# Patient Record
Sex: MALE
[De-identification: names, ages, dates, MRNs, and addresses within clinical notes are randomized per-mention and may not be internally consistent; named-entity substitution may affect disease eponyms.]

---

## 2017-04-19 NOTE — CT
EXAM:

  CT Abdomen and Pelvis With Intravenous Contrast



CLINICAL HISTORY:

  62 years old, male; Pain; Abdominal pain; Generalized; Additional info: 

Increasing abdominal girth ? ascites



TECHNIQUE:

  Axial computed tomography images of the abdomen and pelvis with intravenous 

contrast.

  Coronal and sagittal reformatted images were created and reviewed.



CONTRAST:

  100 mL of visipaque 320 administered intravenously.



EXAM DATE/TIME:

  4/19/2017 5:58 PM



COMPARISON:

  No relevant prior studies available.



FINDINGS:

  LIMITATIONS:  Exam is somewhat limited by mild streak/motion artifact.

  LOWER THORAX:  Heart appears moderately enlarged.  Small hiatal hernia.  

Gastroesophageal reflux.



 ABDOMEN:

  LIVER:  Fatty infiltration of the liver.

  GALLBLADDER AND BILE DUCTS:  Multiple small stones in the gallbladder neck.  

No CT evidence of acute cholecystitis.

  PANCREAS:  No CT evidence of acute pancreatitis.

  SPLEEN:  Spleen is mildly enlarged, measuring 14.8 cm in length.

  ADRENALS:  No acute abnormality of the adrenal glands identified.

  KIDNEYS AND URETERS:  No acute abnormality of the kidneys identified. No 

evidence of significant hydrouereteronephrosis.

  STOMACH AND BOWEL:  Retained stool noted throughout the colon.  Otherwise, no 

significant abnormality of the bowel is identified.  No evidence of large bowel 

obstruction.  No evidence of small bowel obstruction.  No acute abnormality of 

the stomach or duodenum identified.

  APPENDIX:  Appendix is seen, and is within normal limits in appearance.



 PELVIS:

  BLADDER:  No acute abnormality of the bladder identified.

  REPRODUCTIVE:  No acute abnormality of the reproductive organs is seen.



 ABDOMEN and PELVIS:

  INTRAPERITONEAL SPACE:  No evidence of free intraperitoneal air or fluid.

  BONES/JOINTS:  No acute fractures or other acute bony abnormality noted.

  SOFT TISSUES:  No acute abnormality of the visualized soft tissues is seen.

  VASCULATURE:  No evidence of abdominal aortic aneurysm. No evidence of 

periaortic hemorrhage.

  LYMPH NODES:  No evidence of diffuse lymphadenopathy.



IMPRESSION:     

- No evidence of significant acute process.

- Multiple stones in the gallbladder neck. No CT evidence of acute 

cholecystitis.

- Mild splenomegaly.

- See above for remaining findings.

## 2017-04-19 NOTE — C.PDOC
History Of Present Illness


61 y/o male sent to ED by PMD to rule out DVT. Pt is poor historian, pmhx HTN 

and diabetes on unknown medications. Pt complains of bilateral leg swelling and 

new onset ascites for the past 2 months. Pt also reports area to right lower 

leg with wound and tenderness. PE denies pain, fever, chills, chest pain, SOB, 

abdominal pain or any other complaints. History or skull injury with surgery 7 

years ago.


Chief Complaint (Nursing): Lower Extremity Problem/Injury


History Per: Patient


History/Exam Limitations: no limitations


Onset/Duration Of Symptoms: Days


Current Symptoms Are (Timing): Still Present


Severity: Moderate


Recent travel outside of the [place]: No





Past Medical History


Reviewed: Historical Data, Nursing Documentation, Vital Signs


Vital Signs: 


 Last Vital Signs











Temp  98.6 F   04/19/17 17:34


 


Pulse  98 H  04/19/17 17:34


 


Resp  18   04/19/17 17:34


 


BP  170/92 H  04/19/17 17:34


 


Pulse Ox  98   04/19/17 18:12














- Medical History


PMH: HTN


   Denies: Chronic Kidney Disease


Family History: States: Unknown Family Hx





- Social History


Hx Alcohol Use: No (DENIES)


Hx Substance Use: No





- Immunization History


Hx Tetanus Toxoid Vaccination: No


Hx Influenza Vaccination: No


Hx Pneumococcal Vaccination: No





Review Of Systems


Except As Marked, All Systems Reviewed And Found Negative.


Constitutional: Negative for: Fever, Chills


Cardiovascular: Negative for: Chest Pain


Respiratory: Negative for: Shortness of Breath


Gastrointestinal: Negative for: Abdominal Pain


Musculoskeletal: Positive for: Other (bilateral leg swelling; right leg with 

wound and tenderness)





Physical Exam





- Physical Exam


Appears: Non-toxic, No Acute Distress


Skin: Warm, Dry, No Rash


Head: Atraumatic, Normacephalic, Other (old, well healed surgical scar to scalp)


Neck: Normal ROM, Supple


Chest: Symmetrical


Cardiovascular: Rhythm Regular


Respiratory: Normal Breath Sounds, No Rales, No Rhonchi, No Wheezing


Gastrointestinal/Abdominal: Normal Exam, Soft, No Tenderness


Extremity: Pedal Edema (2+ bilateral legs), Other (right medial lower extremity 

with erythematous old abrasion, slightly warm to touch)





ED Course And Treatment


O2 Sat by Pulse Oximetry: 98 (on room air)


Pulse Ox Interpretation: Normal





Medical Decision Making


Medical Decision Making: 


Plan: CT abd, labs, UA, IV fluids





Disposition





- Disposition


Disposition Time: 19:00


Condition: UNKNOWN





- Clinical Impression


Clinical Impression: 


 Abdominal pain, Swelling of lower extremity





- Scribe Statement


The provider has reviewed the documentation as recorded by the Scribe


Sincere Fernandez


Provider Attestation: 





All medical record entries made by the Scribe were at my direction and 

personally dictated by me. I have reviewed the chart and agree that the record 

accurately reflects my personal performance of the history, physical exam, 

medical decision making, and the department course for this patient. I have 

also personally directed, reviewed, and agree with the discharge instructions 

and disposition.





Physician Patient Turnover


Patient Signed Over To: Omar Cali


Handoff Comments: pending labs, CT

## 2017-04-21 NOTE — CP.PCM.PN
Subjective





- Date & Time of Evaluation


Date of Evaluation: 04/20/17


Time of Evaluation: 09:34





- Subjective


Subjective: 


Pt seen and examined, he is for resection of mass in maxillary bone, b/l lower 

extremity edema and swelling still there associated with pain and erythema





Objective





- Vital Signs/Intake and Output


Vital Signs (last 24 hours): 


 











Temp Pulse Resp BP Pulse Ox


 


 97.9 F   90   20   137/76   95 


 


 04/20/17 23:20  04/20/17 23:20  04/20/17 23:20  04/20/17 23:20  04/20/17 23:20








Intake and Output: 


 











 04/20/17 04/21/17





 18:59 06:59


 


Intake Total 500 


 


Balance 500 














- Medications


Medications: 


 Current Medications





Clindamycin HCl (Cleocin)  150 mg PO Q8H [facility]


   Last Admin: 04/20/17 23:35 Dose:  150 mg


Enoxaparin Sodium (Lovenox)  40 mg SC DAILY [facility]


   Last Admin: 04/20/17 10:56 Dose:  40 mg


Hydrochlorothiazide (Hydrodiuril)  25 mg PO DAILY [facility]


   Last Admin: 04/20/17 10:56 Dose:  25 mg


Insulin Aspart (Novolog)  0 unit SC [facility]


   PRN Reason: Protocol


   Last Admin: 04/20/17 22:13 Dose:  Not Given


Lisinopril (Zestril)  40 mg PO DAILY [facility]


   Last Admin: 04/20/17 10:56 Dose:  40 mg


Nifedipine (Procardia Xl)  90 mg PO DAILY [facility]


   Last Admin: 04/20/17 12:09 Dose:  90 mg


Pneumococcal Polyvalent Vaccine (Pneumovax 23 Vaccine)  0.5 ml IM .ONCE ONE


   Stop: 04/21/17 10:01











- Labs


Labs: 


 





 04/20/17 11:47 





 04/20/17 11:47 











- Constitutional


Appears: No Acute Distress





- Head Exam


Head Exam: ATRAUMATIC, NORMAL INSPECTION, NORMOCEPHALIC





- Eye Exam


Eye Exam: EOMI, Normal appearance, PERRL


Pupil Exam: NORMAL ACCOMODATION, PERRL





- Respiratory Exam


Respiratory Exam: Clear to Ausculation Bilateral, NORMAL BREATHING PATTERN





- Cardiovascular Exam


Cardiovascular Exam: REGULAR RHYTHM, +S1, +S2.  absent: Murmur





- GI/Abdominal Exam


GI & Abdominal Exam: Soft, Normal Bowel Sounds.  absent: Tenderness





- Extremities Exam


Additional comments: 


+4 pitting edema





Assessment and Plan


(1) Polycythemia vera


Status: Chronic   





(2) Diabetes mellitus


Status: Chronic   





(3) Hypertension


Status: Chronic   





(4) Bilateral leg edema


Status: Acute   





(5) Dental abscess


Status: Acute

## 2017-04-21 NOTE — VASCLAB
PROCEDURE:  Lower Extremity Venous Duplex Exam.



HISTORY:

MELISA LEG SWELLING 



PRIORS:

Last exam 7/5/2016,normal.



TECHNIQUE:

Bilateral common femoral, femoral, popliteal and posterior tibial, 

peroneal and great saphenous veins were evaluated. Flow was assessed 

with color Doppler, compressibility, assessment of phasic flow and 

augmentation response.



Report prepared by   HEENA Corbin



FINDINGS:



RIGHT:

1. Common Femoral Vein: 



1.1. Compressibility - Fully compressible: Thrombus -  None : Flow - 

Phasic: Augmentation -Normal: Reflux - None.



2. Femoral Vein:



2.1. Compressibility - Fully compressible: Thrombus -  None : Flow - 

Phasic: Augmentation -Normal: Reflux - None.



3. Popliteal Vein: 



3.1. Compressibility - Fully compressible: Thrombus - None :  Flow - 

Phasic: Augmentation -Normal: Reflux - None.



4. Posterior Tibial Vein: 



4.1. Compressibility - Fully compressible: Thrombus -  None: Flow - 

Phasic: Augmentation -Normal: Reflux - None.



5. Peroneal Vein:



5.1. Compressibility - Fully compressible: Thrombus -  None: Flow - 

Phasic: Augmentation -Normal: Reflux - None.



6. Great Saphenous Vein:

6.1. Compressibility - Fully compressible: Thrombus - None: Flow - 

Phasic: Augmentation - Normal: Reflux - None.





LEFT:

1. Common Femoral Vein:



1.1.  Compressibility - Fully compressible: Thrombus -  None: Flow - 

Phasic: Augmentation -Normal: Reflux - None.



2. Femoral Vein:



2.1.  Compressibility - Fully compressible: Thrombus -  None: Flow - 

Phasic: Augmentation -Normal: Reflux - None.



3. Popliteal Vein:



3.1.  Compressibility - Fully compressible: Thrombus -  None : Flow - 

Phasic: Augmentation -Normal: Reflux - None.



4. Posterior Tibial Vein:



4.1.  Compressibility - Fully compressible: Thrombus -  None: Flow - 

Phasic: Augmentation -Normal: Reflux - None.



5. Peroneal Vein:



5.1.  Compressibility - Fully compressible: Thrombus -  None: Flow - 

Phasic: Augmentation -Normal: Reflux - None.



6. Great Saphenous Vein:

6.1.  Compressibility - Fully compressible: Thrombus -  None: Flow - 

Phasic: Augmentation - Normal: Reflux - None.





OTHER FINDINGS:  Right: None significant.



Left: None significant.



IMPRESSION:

Right: 



No evidence of deep or superficial vein thrombosis of the right lower 

extremity. Normal valve function noted of the right side.     



Left: 



No evidence of deep or superficial vein thrombosis of the left lower 

extremity. Normal valve function noted of the left side.

## 2017-04-21 NOTE — CP.PCM.HP
History of Present Illness





- History of Present Illness


History of Present Illness: 


CC: Lower extremity swelling associated with pain, erythema 





HPI:61 y/o male with h/o diabtes, HTn< polycythemia vera,has a mass in his left 

maxillary bone sent to ED by me due to b/l LE edema, no discharge no purulent 

ulcer was seen , i sent to rule out DVT.. Pt also reports area to right lower 

leg with wound and tenderness. PE denies pain, fever, chills, chest pain, SOB, 

abdominal pain or any other complaints. History or skull injury with surgery 7 

years ago.





Present on Admission





- Present on Admission


Any Indicators Present on Admission: No





Review of Systems





- Review of Systems


Systems not reviewed;Unavailable: Acuity of Condition





- Constitutional


Constitutional: Fatigue, Lethargy, Malaise





- EENT


Eyes: absent: As Per HPI, Blind Spots, Blurred Vision, Change in Vision, 

Decreased Night Vision, Diplopia, Discharge, Dry Eye, Exophthalmos, Floaters, 

Irritation, Itchy Eyes, Loss of Peripheral Vision, Pain, Photophobia, Requires 

Corrective Lenses, Sees Flashes, Spots in Vision, Tunnel Vision, Other Visual 

Disturbances, Loss of Vision, Other


Ears: absent: As Per HPI, Decreased Hearing, Ear Discharge, Ear Pain, Tinnitus, 

Abnormal Hearing, Disequilibrium, Dizziness, Other


Nose/Mouth/Throat: absent: As Per HPI, Epistaxis, Nasal Congestion, Nasal 

Discharge, Nasal Obstruction, Nasal Trauma, Nose Pain, Post Nasal Drip, Sinus 

Pain, Sinus Pressure, Bleeding Gums, Change in Voice, Dental Pain, Dry Mouth, 

Dysphagia, Halitosis, Hoarsness, Lip Swelling, Mouth Lesions, Mouth Pain, 

Odynophagia, Sore Throat, Throat Swelling, Tongue Swelling, Facial Pain, Neck 

Pain, Neck Mass, Other





- Cardiovascular


Cardiovascular: absent: As Per HPI, Acrocyanosis, Chest Pain, Chest Pain at Rest

, Chest Pain with Activity, Claudication, Diaphoresis, Dyspnea, Dyspnea on 

Exertion, Edema, Irregular Heart Rhythm, Pain Radiating to Arm/Neck/Jaw, Leg 

Edema, Leg Ulcers, Lightheadedness, Orthopnea, Palpitations, Paroxysmal 

Nocturnal Dyspnea, Pedal Edema, Radiating Pain, Rapid Heart Rate, Slow Heart 

Rate, Syncope, Other





- Respiratory


Respiratory: absent: As Per HPI, Cough, Dyspnea, Hemoptysis, Dyspnea on Exertion

, Wheezing, Snoring, Stridor, Pain on Inspiration, Chest Congestion, Excessive 

Mucous Production, Change in Mucous Color, Pain with Coughing, Other





- Gastrointestinal


Gastrointestinal: absent: As Per HPI, Abdominal Pain, Belching, Bloating, 

Change in Bowel Habits, Change in Stool Character, Coffee Ground Emesis, 

Constipation, Cramping, Diarrhea, Dyspepsia, Dysphagia, Early Satiety, 

Excessive Flatus, Fecal Incontinence, Heartburn, Hematemesis, Hematochezia, 

Loose Stools, Melena, Nausea, Odynophagia, Temesmus, Vomiting, Other





- Genitourinary


Genitourinary: absent: As Per HPI, Change in Urinary Stream, Difficulty 

Urinating, Dysuria, Flank Pain, Hematuria, Pyuria, Nocturia, Urinary 

Incontinence, Urinary Frequency, Urinary Hesitance, Urinary Urgency, Voiding 

Freq/Small Amts, Freq UTI, Hx Renal/Bladder Calculi, Hx /Renal Surgery, 

Bladder Distension, Other





- Musculoskeletal


Musculoskeletal: Limited Range of Motion, Myalgias, Numbness, Tingling





- Integumentary


Integumentary: Erythema, Swelling.  absent: As Per HPI, Acne, Alopecia, 

Bleeding Lesions, Change in Hair, Change in Nails, Change in Pigmentation, 

Changing Lesions, Dry Skin, Furuncle, Hirsutism, Lesions, New Lesions, Non-

Healing Lesions, Photosensitivity, Pruritus, Rash, Skin Pain, Skin Ulcer, Sores

, Striae, Unusual Bruising, Wounds, Jaundice, Other





- Hematologic/Lymphatic


Hematologic: As Per HPI





Past Patient History





- Infectious Disease


Hx of Infectious Diseases: None





- Past Medical History & Family History


Past Medical History?: Yes





- Past Social History


Smoking Status: Never Smoked





- CARDIAC


Hx Cardiac Disorders: Yes


Hx Hypertension: Yes





- PULMONARY


Hx Respiratory Disorders: No





- NEUROLOGICAL


Hx Neurological Disorder: No





- HEENT


Hx HEENT Problems: No





- RENAL


Hx Chronic Kidney Disease: No





- ENDOCRINE/METABOLIC


Hx Endocrine Disorders: Yes


Hx Diabetes Mellitus Type 2: Yes





- HEMATOLOGICAL/ONCOLOGICAL


Hx Blood Disorders: No





- INTEGUMENTARY


Hx Dermatological Problems: No





- MUSCULOSKELETAL/RHEUMATOLOGICAL


Hx Falls: Yes





- GASTROINTESTINAL


Hx Gastrointestinal Disorders: Yes


Hx Gastroesophageal Reflux: Yes





- GENITOURINARY/GYNECOLOGICAL


Hx Genitourinary Disorders: No





- PSYCHIATRIC


Hx Substance Use: No





- SURGICAL HISTORY


Hx Surgeries: Yes


Other/Comment: surgery to head due to trauma DUE TO FALL IN 2009





- ANESTHESIA


Hx Anesthesia: Yes


Hx Anesthesia Reactions: No


Hx Malignant Hyperthermia: No


Has any member of the family had a problem w/ anesthesia?: No





Meds


Home Medications: 


 Home Medication List











 Medication  Instructions  Recorded  Confirmed  Type


 


Clindamycin [Cleocin] 150 mg PO Q8H #21 cap 04/21/17  Rx











Allergies/Adverse Reactions: 


 Allergies











Allergy/AdvReac Type Severity Reaction Status Date / Time


 


No Known Allergies Allergy   Verified 05/10/16 14:44














Physical Exam





- Constitutional


Appears: No Acute Distress





- Head Exam


Head Exam: ATRAUMATIC, NORMAL INSPECTION, NORMOCEPHALIC





- Eye Exam


Eye Exam: EOMI, Normal appearance, PERRL


Pupil Exam: NORMAL ACCOMODATION, PERRL





- Neck Exam


Neck exam: Positive for: Normal Inspection





- Respiratory Exam


Respiratory Exam: Clear to Auscultation Bilateral, NORMAL BREATHING PATTERN





- Cardiovascular Exam


Cardiovascular Exam: REGULAR RHYTHM





- Extremities Exam


Extremities exam: Positive for: pedal edema, tenderness





Results





- Vital Signs


Recent Vital Signs: 





 Last Vital Signs











Temp  97.9 F   04/20/17 23:20


 


Pulse  90   04/20/17 23:20


 


Resp  20   04/20/17 23:20


 


BP  137/76   04/20/17 23:20


 


Pulse Ox  95   04/20/17 23:20














- Labs


Result Diagrams: 


 04/20/17 11:47





 04/20/17 11:47


Labs: 





 Laboratory Results - last 24 hr











  04/20/17 04/20/17 04/20/17





  06:33 11:24 11:47


 


WBC    15.6 H


 


RBC    6.02 H


 


Hgb    16.1


 


Hct    48.4


 


MCV    80.4


 


MCH    26.7 L


 


MCHC    33.2


 


RDW    15.6 H


 


Plt Count    860 H


 


MPV    8.1


 


Neut % (Auto)    81.0 H


 


Lymph % (Auto)    11.5 L


 


Mono % (Auto)    4.4


 


Eos % (Auto)    1.8


 


Baso % (Auto)    1.3


 


Neut #    12.7 H


 


Lymph #    1.8


 


Mono #    0.7


 


Eos #    0.3


 


Baso #    0.2


 


Sodium    140


 


Potassium    3.8


 


Chloride    97 L


 


Carbon Dioxide    28


 


Anion Gap    19


 


BUN    20


 


Creatinine    1.0


 


Est GFR ( Amer)    > 60


 


Est GFR (Non-Af Amer)    > 60


 


POC Glucose (mg/dL)  137 H  196 H 


 


Random Glucose    200 H


 


Calcium    9.0














  04/20/17 04/20/17





  16:45 20:49


 


WBC  


 


RBC  


 


Hgb  


 


Hct  


 


MCV  


 


MCH  


 


MCHC  


 


RDW  


 


Plt Count  


 


MPV  


 


Neut % (Auto)  


 


Lymph % (Auto)  


 


Mono % (Auto)  


 


Eos % (Auto)  


 


Baso % (Auto)  


 


Neut #  


 


Lymph #  


 


Mono #  


 


Eos #  


 


Baso #  


 


Sodium  


 


Potassium  


 


Chloride  


 


Carbon Dioxide  


 


Anion Gap  


 


BUN  


 


Creatinine  


 


Est GFR ( Amer)  


 


Est GFR (Non-Af Amer)  


 


POC Glucose (mg/dL)  232 H  280 H


 


Random Glucose  


 


Calcium  














Assessment & Plan


(1) Polycythemia vera


Status: Chronic   





(2) Diabetes mellitus


Status: Chronic   





(3) Hypertension


Status: Chronic   





(4) Bilateral leg edema


Status: Acute   





(5) Dental abscess


Status: Acute

## 2017-04-21 NOTE — CP.PCM.PN
Subjective





- Date & Time of Evaluation


Date of Evaluation: 04/21/17


Time of Evaluation: 11:15





- Subjective


Subjective: 


Pt seen and examined today , denies any complaints , left side  facial swelling 

and redness persists , 


B/L extremity swollen , RLE erythema present,  no open area , c/o mild 

discomfort


B/L le venous duplex scan - Negative 





Objective





- Vital Signs/Intake and Output


Vital Signs (last 24 hours): 


 











Temp Pulse Resp BP Pulse Ox


 


 97.9 F   81   20   138/83   95 


 


 04/21/17 08:45  04/21/17 09:00  04/21/17 08:45  04/21/17 08:45  04/21/17 08:45








Intake and Output: 


 











 04/21/17 04/21/17





 06:59 18:59


 


Output Total 300 


 


Balance -300 














- Medications


Medications: 


 Current Medications





Clindamycin HCl (Cleocin)  150 mg PO Q8H [facility]


   Last Admin: 04/21/17 07:42 Dose:  150 mg


Enoxaparin Sodium (Lovenox)  40 mg SC DAILY [facility]


   Last Admin: 04/21/17 10:53 Dose:  40 mg


Hydrochlorothiazide (Hydrodiuril)  25 mg PO DAILY [facility]


   Last Admin: 04/21/17 10:52 Dose:  25 mg


Insulin Aspart (Novolog)  0 unit SC [facility]


   PRN Reason: Protocol


   Last Admin: 04/21/17 08:04 Dose:  1 unit


Lisinopril (Zestril)  40 mg PO DAILY [facility]


   Last Admin: 04/21/17 10:52 Dose:  40 mg


Nifedipine (Procardia Xl)  90 mg PO DAILY [facility]


   Last Admin: 04/21/17 10:52 Dose:  90 mg











- Labs


Labs: 


 





 04/20/17 11:47 





 04/20/17 11:47 











Assessment and Plan





- Assessment and Plan (Free Text)


Assessment: 


62 yr old male admitted for B/L  LE swelling, r/o DVT 


duplex scan - negative 


Pt has appointment today for dental abscess and maxillary   bone  resection 


D/W  Dr. Nevarez, stable for discharge home today  and f/u with dr. Nevarez 

office in 1 week 


Discharge plan discussed with sister over phone, 


RX given to continue clindamycin for 1 more week

## 2017-04-22 NOTE — CP.PCM.DIS
Provider





- Provider


Date of Admission: 


04/19/17 21:52





Attending physician: 


Terrell Nevarez MD





Time Spent in preparation of Discharge (in minutes): 30





Diagnosis





- Discharge Diagnosis


(1) Polycythemia vera


Status: Chronic   





(2) Diabetes mellitus


Status: Chronic   





(3) Hypertension


Status: Chronic   





(4) Bilateral leg edema


Status: Acute   





(5) Dental abscess


Status: Acute   





Hospital Course





- Lab Results


Lab Results: 


 Micro Results





04/19/17 Unknown   Urine   Urine Culture - Final


                                No Growth (<1,000 CFU/ML)





 Most Recent Lab Values











WBC  15.6 K/uL (4.8-10.8)  H  04/20/17  11:47    


 


RBC  6.02 Mil/uL (4.40-5.90)  H  04/20/17  11:47    


 


Hgb  16.1 g/dL (12.0-18.0)   04/20/17  11:47    


 


Hct  48.4 % (35.0-51.0)   04/20/17  11:47    


 


MCV  80.4 fL (80.0-94.0)   04/20/17  11:47    


 


MCH  26.7 pg (27.0-31.0)  L  04/20/17  11:47    


 


MCHC  33.2 g/dL (33.0-37.0)   04/20/17  11:47    


 


RDW  15.6 % (11.5-14.5)  H  04/20/17  11:47    


 


Plt Count  860 K/uL (130-400)  H  04/20/17  11:47    


 


MPV  8.1 fL (7.2-11.7)   04/20/17  11:47    


 


Neut % (Auto)  81.0 % (50.0-75.0)  H  04/20/17  11:47    


 


Lymph % (Auto)  11.5 % (20.0-40.0)  L  04/20/17  11:47    


 


Mono % (Auto)  4.4 % (0.0-10.0)   04/20/17  11:47    


 


Eos % (Auto)  1.8 % (0.0-4.0)   04/20/17  11:47    


 


Baso % (Auto)  1.3 % (0.0-2.0)   04/20/17  11:47    


 


Neut #  12.7 K/uL (1.8-7.0)  H  04/20/17  11:47    


 


Lymph #  1.8 K/uL (1.0-4.3)   04/20/17  11:47    


 


Mono #  0.7 K/uL (0.0-0.8)   04/20/17  11:47    


 


Eos #  0.3 K/uL (0.0-0.7)   04/20/17  11:47    


 


Baso #  0.2 K/uL (0.0-0.2)   04/20/17  11:47    


 


Differential Comment     04/19/17  19:02    


 


Smear Path Review     04/19/17  19:02    


 


D-Dimer, Quantitative  < 200 ng/mlDDU (0-243)   04/19/17  19:02    


 


Sodium  140 mmol/L (132-148)   04/20/17  11:47    


 


Potassium  3.8 mmol/L (3.6-5.2)   04/20/17  11:47    


 


Chloride  97 mmol/L ()  L  04/20/17  11:47    


 


Carbon Dioxide  28 mmol/L (22-30)   04/20/17  11:47    


 


Anion Gap  19  (10-20)   04/20/17  11:47    


 


BUN  20 mg/dL (9-20)   04/20/17  11:47    


 


Creatinine  1.0 MG/DL (0.8-1.5)   04/20/17  11:47    


 


Est GFR ( Amer)  > 60   04/20/17  11:47    


 


Est GFR (Non-Af Amer)  > 60   04/20/17  11:47    


 


POC Glucose (mg/dL)  264 mg/dL ()  H  04/21/17  11:30    


 


Random Glucose  200 mg/dL ()  H  04/20/17  11:47    


 


Calcium  9.0 mg/dl (8.6-10.4)   04/20/17  11:47    


 


Total Bilirubin  0.6 mg/dL (0.2-1.3)   04/19/17  19:02    


 


AST  46 U/L (17-59)   04/19/17  19:02    


 


ALT  32 U/L (21-72)   04/19/17  19:02    


 


Alkaline Phosphatase  105 U/L ()   04/19/17  19:02    


 


NT-Pro-B Natriuret Pep  87.0 pg/mL (0-900)   04/19/17  19:02    


 


Total Protein  8.6 g/dL (6.3-8.3)  H  04/19/17  19:02    


 


Albumin  5.0 g/dL (3.5-5.0)   04/19/17  19:02    


 


Globulin  3.5 gm/dL (2.2-3.9)   04/19/17  19:02    


 


Albumin/Globulin Ratio  1.4  (1.0-2.1)   04/19/17  19:02    


 


Lipase  163 U/L ()   04/19/17  19:02    


 


Urine Color  Yellow  (YELLOW)   04/19/17  19:02    


 


Urine Clarity  Clear  (Clear)   04/19/17  19:02    


 


Urine pH  6.0  (5.0-8.0)   04/19/17  19:02    


 


Ur Specific Gravity  1.018  (1.003-1.030)   04/19/17  19:02    


 


Urine Protein  1+ mg/dL (NEGATIVE)  H  04/19/17  19:02    


 


Urine Glucose (UA)  Normal mg/dL (Normal)   04/19/17  19:02    


 


Urine Ketones  Negative mg/dL (NEGATIVE)   04/19/17  19:02    


 


Urine Blood  Negative  (NEGATIVE)   04/19/17  19:02    


 


Urine Nitrate  Negative  (NEGATIVE)   04/19/17  19:02    


 


Urine Bilirubin  Negative  (NEGATIVE)   04/19/17  19:02    


 


Urine Urobilinogen  Normal mg/dL (0.2-1.0)   04/19/17  19:02    


 


Ur Leukocyte Esterase  Neg Clau/uL (Negative)   04/19/17  19:02    


 


Urine WBC (Auto)  1 /hpf (0-5)   04/19/17  19:02    


 


Urine RBC (Auto)  < 1 /hpf (0-3)   04/19/17  19:02    


 


Urine Bacteria  Rare  (<OCC)   04/19/17  19:02    














- Hospital Course


Hospital Course: 


Pt is for discharge today, Pt seen and examined today , denies any complaints , 

left side  facial swelling and redness persists , B/L extremity swollen , RLE 

erythema present,  no open area , c/o mild discomfort


B/L le venous duplex scan - Negative 








Discharge Exam





- Head Exam


Head Exam: ATRAUMATIC, NORMAL INSPECTION, NORMOCEPHALIC





- Eye Exam


Eye Exam: EOMI, Normal appearance, PERRL


Pupil Exam: NORMAL ACCOMODATION, PERRL





- ENT Exam


ENT Exam: Mucous Membranes Moist





- Respiratory Exam


Respiratory Exam: Clear to PA & Lateral, NORMAL BREATHING PATTERN





- Cardiovascular Exam


Cardiovascular Exam: REGULAR RHYTHM, +S1, +S2





- GI/Abdominal Exam


GI & Abdominal Exam: Normal Bowel Sounds





Discharge Plan





- Discharge Medications


Prescriptions: 


Clindamycin [Cleocin] 150 mg PO Q8H #21 cap





- Follow Up Plan


Condition: FAIR


Disposition: HOME/ ROUTINE


Instructions:  Clindamycin (By mouth), Dental Abscess (GEN), Edema (DC)


Additional Instructions: 


f/u with Dr. Nevarez office next tuesday 


f/u with Dentist today 


Continue medication as per Med. REc.

## 2019-02-14 ENCOUNTER — HOSPITAL ENCOUNTER (OUTPATIENT)
Dept: HOSPITAL 31 - C.RADIC | Age: 64
End: 2019-02-14
Payer: MEDICAID

## 2019-02-14 DIAGNOSIS — M25.50: Primary | ICD-10-CM

## 2019-03-27 ENCOUNTER — HOSPITAL ENCOUNTER (OUTPATIENT)
Dept: HOSPITAL 31 - C.USIC | Age: 64
End: 2019-03-27
Payer: MEDICAID

## 2019-03-27 DIAGNOSIS — R31.0: Primary | ICD-10-CM
